# Patient Record
Sex: FEMALE | URBAN - METROPOLITAN AREA
[De-identification: names, ages, dates, MRNs, and addresses within clinical notes are randomized per-mention and may not be internally consistent; named-entity substitution may affect disease eponyms.]

---

## 2023-10-22 ENCOUNTER — TELEPHONE (OUTPATIENT)
Dept: NURSING | Facility: CLINIC | Age: 88
End: 2023-10-22

## 2023-10-22 NOTE — TELEPHONE ENCOUNTER
Daughter in [place] calling with neighbor of patient, who is currently in [place] and with patient, about the patient who is having severe abdominal pain.  Informed unable to triage due to patient being in [place].  Informed that for constant abdominal pain that is severe ED evaluation is needed now. If patient unable to get into a car it is recommended to call 911 for EMS to assist.   Iona Wiley RN   10/22/23 10:07 AM  [facility] Nurse Advisor